# Patient Record
Sex: FEMALE | Race: WHITE | Employment: OTHER | URBAN - METROPOLITAN AREA
[De-identification: names, ages, dates, MRNs, and addresses within clinical notes are randomized per-mention and may not be internally consistent; named-entity substitution may affect disease eponyms.]

---

## 2017-11-03 ENCOUNTER — HOSPITAL ENCOUNTER (OUTPATIENT)
Dept: MAMMOGRAPHY | Age: 62
Discharge: HOME | End: 2017-11-03
Attending: OBSTETRICS & GYNECOLOGY
Payer: MEDICARE

## 2017-11-03 ENCOUNTER — HOSPITAL ENCOUNTER (OUTPATIENT)
Dept: MAMMOGRAPHY | Age: 62
Discharge: HOME | End: 2017-11-03
Attending: INTERNAL MEDICINE
Payer: MEDICARE

## 2017-11-03 DIAGNOSIS — Z12.31 VISIT FOR SCREENING MAMMOGRAM: ICD-10-CM

## 2017-11-03 DIAGNOSIS — M85.80 OSTEOPENIA: ICD-10-CM

## 2017-11-03 PROCEDURE — 77080 DXA BONE DENSITY AXIAL: CPT

## 2017-11-03 PROCEDURE — 77067 SCR MAMMO BI INCL CAD: CPT

## 2018-11-26 ENCOUNTER — HOSPITAL ENCOUNTER (OUTPATIENT)
Dept: MAMMOGRAPHY | Age: 63
Discharge: HOME | End: 2018-11-26
Attending: OBSTETRICS & GYNECOLOGY
Payer: MEDICARE

## 2018-11-26 DIAGNOSIS — Z12.31 VISIT FOR SCREENING MAMMOGRAM: ICD-10-CM

## 2018-11-26 PROCEDURE — 77067 SCR MAMMO BI INCL CAD: CPT

## 2019-06-11 ENCOUNTER — HOSPITAL ENCOUNTER (EMERGENCY)
Age: 64
Discharge: HOME | End: 2019-06-11
Attending: EMERGENCY MEDICINE
Payer: MEDICARE

## 2019-06-11 VITALS
RESPIRATION RATE: 18 BRPM | BODY MASS INDEX: 24.84 KG/M2 | HEIGHT: 62 IN | HEART RATE: 90 BPM | DIASTOLIC BLOOD PRESSURE: 86 MMHG | OXYGEN SATURATION: 99 % | SYSTOLIC BLOOD PRESSURE: 176 MMHG | WEIGHT: 135 LBS | TEMPERATURE: 98.1 F

## 2019-06-11 DIAGNOSIS — M54.50 ACUTE MIDLINE LOW BACK PAIN WITHOUT SCIATICA: Primary | ICD-10-CM

## 2019-06-11 LAB
ALBUMIN SERPL-MCNC: 4.4 G/DL (ref 3.2–4.6)
ALBUMIN/GLOB SERPL: 1.3 {RATIO} (ref 1.2–3.5)
ALP SERPL-CCNC: 64 U/L (ref 50–136)
ALT SERPL-CCNC: 29 U/L (ref 12–65)
ANION GAP SERPL CALC-SCNC: 8 MMOL/L (ref 7–16)
AST SERPL-CCNC: 28 U/L (ref 15–37)
BACTERIA URNS QL MICRO: 0 /HPF
BASOPHILS # BLD: 0 K/UL (ref 0–0.2)
BASOPHILS NFR BLD: 0 % (ref 0–2)
BILIRUB SERPL-MCNC: 0.3 MG/DL (ref 0.2–1.1)
BUN SERPL-MCNC: 17 MG/DL (ref 8–23)
CALCIUM SERPL-MCNC: 10.4 MG/DL (ref 8.3–10.4)
CASTS URNS QL MICRO: 0 /LPF
CHLORIDE SERPL-SCNC: 105 MMOL/L (ref 98–107)
CO2 SERPL-SCNC: 28 MMOL/L (ref 21–32)
CREAT SERPL-MCNC: 0.78 MG/DL (ref 0.6–1)
DIFFERENTIAL METHOD BLD: ABNORMAL
EOSINOPHIL # BLD: 0 K/UL (ref 0–0.8)
EOSINOPHIL NFR BLD: 1 % (ref 0.5–7.8)
EPI CELLS #/AREA URNS HPF: 0 /HPF
ERYTHROCYTE [DISTWIDTH] IN BLOOD BY AUTOMATED COUNT: 12.3 % (ref 11.9–14.6)
GLOBULIN SER CALC-MCNC: 3.5 G/DL (ref 2.3–3.5)
GLUCOSE SERPL-MCNC: 137 MG/DL (ref 65–100)
HCT VFR BLD AUTO: 44.3 % (ref 35.8–46.3)
HGB BLD-MCNC: 15.1 G/DL (ref 11.7–15.4)
IMM GRANULOCYTES # BLD AUTO: 0 K/UL (ref 0–0.5)
IMM GRANULOCYTES NFR BLD AUTO: 0 % (ref 0–5)
LYMPHOCYTES # BLD: 1.2 K/UL (ref 0.5–4.6)
LYMPHOCYTES NFR BLD: 38 % (ref 13–44)
MCH RBC QN AUTO: 30.6 PG (ref 26.1–32.9)
MCHC RBC AUTO-ENTMCNC: 34.1 G/DL (ref 31.4–35)
MCV RBC AUTO: 89.9 FL (ref 79.6–97.8)
MONOCYTES # BLD: 0.2 K/UL (ref 0.1–1.3)
MONOCYTES NFR BLD: 7 % (ref 4–12)
NEUTS SEG # BLD: 1.6 K/UL (ref 1.7–8.2)
NEUTS SEG NFR BLD: 54 % (ref 43–78)
NRBC # BLD: 0 K/UL (ref 0–0.2)
PLATELET # BLD AUTO: 201 K/UL (ref 150–450)
PMV BLD AUTO: 9.9 FL (ref 9.4–12.3)
POTASSIUM SERPL-SCNC: 3.7 MMOL/L (ref 3.5–5.1)
PROT SERPL-MCNC: 7.9 G/DL (ref 6.3–8.2)
RBC # BLD AUTO: 4.93 M/UL (ref 4.05–5.2)
RBC #/AREA URNS HPF: NORMAL /HPF
SODIUM SERPL-SCNC: 141 MMOL/L (ref 136–145)
WBC # BLD AUTO: 3.1 K/UL (ref 4.3–11.1)
WBC URNS QL MICRO: NORMAL /HPF

## 2019-06-11 PROCEDURE — 99284 EMERGENCY DEPT VISIT MOD MDM: CPT | Performed by: EMERGENCY MEDICINE

## 2019-06-11 PROCEDURE — 85025 COMPLETE CBC W/AUTO DIFF WBC: CPT

## 2019-06-11 PROCEDURE — 81015 MICROSCOPIC EXAM OF URINE: CPT

## 2019-06-11 PROCEDURE — 81003 URINALYSIS AUTO W/O SCOPE: CPT | Performed by: EMERGENCY MEDICINE

## 2019-06-11 PROCEDURE — 80053 COMPREHEN METABOLIC PANEL: CPT

## 2019-06-11 RX ORDER — METHOCARBAMOL 750 MG/1
750 TABLET, FILM COATED ORAL 4 TIMES DAILY
Qty: 30 TAB | Refills: 0 | Status: SHIPPED | OUTPATIENT
Start: 2019-06-11 | End: 2019-06-19

## 2019-06-11 RX ORDER — OXYCODONE HYDROCHLORIDE 5 MG/1
5 TABLET ORAL
Qty: 15 TAB | Refills: 0 | Status: SHIPPED | OUTPATIENT
Start: 2019-06-11 | End: 2019-06-16

## 2019-06-11 RX ORDER — PROMETHAZINE HYDROCHLORIDE 25 MG/1
25 TABLET ORAL
Qty: 15 TAB | Refills: 0 | Status: SHIPPED | OUTPATIENT
Start: 2019-06-11

## 2019-06-11 NOTE — ED TRIAGE NOTES
Pt arrives with c/o lower back pain, numbness radiating down bilateral thighs and into great toes. Hx chronic back pain, followed by chiropractor. Seen by chiropractor today, sent to ed. Reports onset of worsening back pain approx 1 weeks pta. Denies reinjury or trauma. Reports intermittent episodes of urinary incontinence over last 2 weeks. Reports 2 episodes bowel incontinence however states resolved after \"adjusted\" by chriopractor. Reports pain relieved with cold. Attempted muscle relaxers and acupuncture. Ambulatory with steady gait into triage.

## 2019-06-12 NOTE — ED NOTES
I have reviewed discharge instructions with the patient. The patient verbalized understanding. Patient left ED via Discharge Method: ambulatory to Home with . Opportunity for questions and clarification provided. Patient given 3 scripts. To continue your aftercare when you leave the hospital, you may receive an automated call from our care team to check in on how you are doing. This is a free service and part of our promise to provide the best care and service to meet your aftercare needs.  If you have questions, or wish to unsubscribe from this service please call [phone]. Thank you for Choosing our [employer] [facility] Emergency Department.

## 2019-06-12 NOTE — ED PROVIDER NOTES
Patient states she has had back pain off and on for many years. She sees a chiropractor for this. Typically with improvement in her pain. For the past week, she has had much more severe pain. She describes it as midline lower nonradiating back pain, worse with certain movements. She denies any radiation to her legs. She has had some slight occasional paresthesias to her anterior thighs. She went to her chiropractor today who got concerned about the severity of her pain and the paresthesias in her legs and wondered her to get an MRI. She called her brother who is a neurologist who also said that she would likely need an MRI. She presents with her back pain, requesting an MRI. She denies any symptoms of saddle anesthesia or urinary or bowel incontinence/retention. Elements of this note were created using speech recognition software. As such, errors of speech recognition may be present.            Past Medical History:   Diagnosis Date    Chemotherapy convalescence or palliative care currently since 10.2013    leukemia    Leukemia ([facility] [address] 10.2013    Squamous cell carcinoma in situ of skin of left lower leg 5.2013       Past Surgical History:   Procedure Laterality Date    US GUIDED CORE BREAST BIOPSY Right 8.2007    benign         Family History:   Problem Relation Age of Onset    Breast Cancer Neg Hx        Social History     Socioeconomic History    Marital status:      Spouse name: Not on file    Number of children: Not on file    Years of education: Not on file    Highest education level: Not on file   Occupational History    Not on file   Social Needs    Financial resource strain: Not on file    Food insecurity:     Worry: Not on file     Inability: Not on file    Transportation needs:     Medical: Not on file     Non-medical: Not on file   Tobacco Use    Smoking status: Not on file   Substance and Sexual Activity    Alcohol use: Not on file    Drug use: Not on file    Sexual activity: Not on file   Lifestyle    Physical activity:     Days per week: Not on file     Minutes per session: Not on file    Stress: Not on file   Relationships    Social connections:     Talks on phone: Not on file     Gets together: Not on file     Attends Yazidism service: Not on file     Active member of club or organization: Not on file     Attends meetings of clubs or organizations: Not on file     Relationship status: Not on file    Intimate partner violence:     Fear of current or ex partner: Not on file     Emotionally abused: Not on file     Physically abused: Not on file     Forced sexual activity: Not on file   Other Topics Concern    Not on file   Social History Narrative    Not on file         ALLERGIES: Betadine [povidone-iodine] and Sulfa (sulfonamide antibiotics)    Review of Systems   Constitutional: Negative for chills and fever. Gastrointestinal: Negative for nausea and vomiting. All other systems reviewed and are negative. Vitals:    06/11/19 1923 06/11/19 2001   BP: (!) 189/95 186/82   Pulse: (!) 107 93   Resp: 18    Temp: 98.1 °F (36.7 °C)    SpO2: 98% 100%   Weight: 61.2 kg (135 lb)    Height: 5' 2\" (1.575 m)             Physical Exam   Constitutional: She is oriented to person, place, and time. She appears well-developed and well-nourished. HENT:   Head: Normocephalic and atraumatic. Eyes: Pupils are equal, round, and reactive to light. Conjunctivae are normal.   Neck: Normal range of motion. Neck supple. Musculoskeletal: She exhibits tenderness. She exhibits no edema. Back:    tenderness to palpation lower back midline as indicated   Neurological: She is alert and oriented to person, place, and time. 5/5 strength bilateral lower extremities in ankle flexion and extension and leg raise. She has normal sensation in her lower extremities   Skin: Skin is warm and dry. Psychiatric: She has a normal mood and affect.  Her behavior is normal.   Nursing note and vitals reviewed. MDM  Number of Diagnoses or Management Options  Acute midline low back pain without sciatica: new and requires workup  Diagnosis management comments: 9:14 PM patient shows no signs of cauda equina syndrome.   She does have an acute exacerbation of her back pain, the plan is to get an outpatient MRI tomorrow and she went to get it done at [facility]       Amount and/or Complexity of Data Reviewed  Clinical lab tests: reviewed  Tests in the radiology section of CPT®: ordered    Risk of Complications, Morbidity, and/or Mortality  Presenting problems: moderate  Diagnostic procedures: moderate  Management options: moderate    Patient Progress  Patient progress: stable         Procedures

## 2019-06-12 NOTE — DISCHARGE INSTRUCTIONS
Follow-up with [facility] tomorrow for your outpatient MRI. Return to the emergency department if your symptoms worsen.

## 2019-11-29 ENCOUNTER — HOSPITAL ENCOUNTER (OUTPATIENT)
Dept: MAMMOGRAPHY | Age: 64
Discharge: HOME | End: 2019-11-29
Attending: OBSTETRICS & GYNECOLOGY
Payer: MEDICARE

## 2019-11-29 DIAGNOSIS — Z12.31 VISIT FOR SCREENING MAMMOGRAM: ICD-10-CM

## 2019-11-29 PROCEDURE — 77067 SCR MAMMO BI INCL CAD: CPT

## 2020-07-21 ENCOUNTER — HOSPITAL ENCOUNTER (OUTPATIENT)
Dept: PHYSICAL THERAPY | Age: 65
Discharge: HOME | End: 2020-07-21
Payer: MEDICARE

## 2020-07-21 PROCEDURE — 97110 THERAPEUTIC EXERCISES: CPT

## 2020-07-21 PROCEDURE — 97161 PT EVAL LOW COMPLEX 20 MIN: CPT

## 2020-07-21 NOTE — PROGRESS NOTES
Didi Preciado  : 1955  [place]  Secondary: Andrea Harris at [facility]  [address], [address], [place] [address]  [phone]   [phone]      OUTPATIENT PHYSICAL THERAPY: Daily Treatment Note 2020  ICD-10: Treatment Diagnosis: R27.8 Lack of coordination (muscle incoordination), R35.0 Frequency of micturition, R39.15 Urgency of Urination Excludes1: urge incontinence (N39.41,N39.46)    Precautions/Allergies:   Betadine [povidone-iodine] and Sulfa (sulfonamide antibiotics)   TREATMENT PLAN:  Effective Dates: 2020 TO 10/19/2020 (90 days). Frequency/Duration: 1 time a week for 90 Day(s) MEDICAL/REFERRING DIAGNOSIS:  Urinary frequency [R35.0]   DATE OF ONSET: 1 year ago  REFERRING PHYSICIAN: Pierce Reyes,*  MD Orders: evaluate and treat  Return MD Appointment: -     Pre-treatment Symptoms/Complaints:  See evaluation  Pain: Initial:   0 Post Session:  010   Medications Last Reviewed:  2020   Updated Objective Findings:  See evaluation note from today   TREATMENT:   THERAPEUTIC EXERCISE: (10 minutes):  Exercises per grid below to improve mobility, strength and coordination. Required minimal visual, verbal and manual cues to promote proper body alignment, promote proper body posture and promote proper body mechanics. Progressed resistance, range and repetitions as indicated. All exercises performed with emphasis on kegel and breathing in order improve coordination, awareness and to minimize symptoms. Date:  20 Date:   Date:     Activity/Exercise Parameters Parameters Parameters   Patient Education Discussed HEP and POC     Bladder health reviewed     drops x10                                Pt gives verbal consent to internal  assessment/treatment  no chaperon present.      [employer] [facility]     Treatment/Session Summary:  Pt reports good understanding of plan of care, as well as prescribed home exercise program.  All questions were answered to pt's satisfaction. Pt was invited to call with any further questions or concerns. · Response to Treatment:  see evaluation. · Communication/Consultation:  None today  · Equipment provided today:  None today  · Recommendations/Intent for next treatment session: Next visit will focus on biofeedback, urge suppression, manual, there ex.   Total Treatment Billable Duration:  10 minutes  PT Patient Time In/Time Out  Time In: 1300  Time Out: [address], DPT    Future Appointments   Date Time Provider Shlomo Hankins   7/29/2020  2:00 PM Merrily Sicilian, DPT [facility]   8/5/2020  2:00 PM Merrily Sicilian, DPT [facility]   8/11/2020  2:00 PM Merrily Sicilian, DPT [facility]   8/20/2020  2:00 PM Merrily Sicilian, DPT [facility]   9/3/2020  2:00 PM Merrily Sicilian, DPT [facility]   9/10/2020  2:00 PM Merrily Sicilian, DPT [facility]   11/30/2020  3:30 PM [facility]

## 2020-07-21 NOTE — THERAPY EVALUATION
Maile Lockwood  : 1955  [place]  Secondary: [facility] at [facility], [address], [place] [address]  [phone]   [phone]       OUTPATIENT PHYSICAL THERAPY:Initial Assessment 2020   ICD-10: Treatment Diagnosis: R27.8 Lack of coordination (muscle incoordination), R35.0 Frequency of micturition, R39.15 Urgency of Urination Excludes1: urge incontinence (N39.41,N39.46)    Precautions/Allergies:   Betadine [povidone-iodine] and Sulfa (sulfonamide antibiotics)   TREATMENT PLAN:  Effective Dates: 2020 TO 10/19/2020 (90 days). Frequency/Duration: 1 time a week for 90 Day(s) MEDICAL/REFERRING DIAGNOSIS:  Urinary frequency [R35.0]   DATE OF ONSET: 1 year ago  REFERRING PHYSICIAN: Jacky Santa,*  MD Orders: evaluate and treat  Return MD Appointment: -     INITIAL ASSESSMENT:  Ms. Nakul Love presents with pelvic floor muscle over activity, lack of coordination, and strength. . Patient also has mild bladder health habits that may be worsening symptoms. Patient would benefit from skilled physical therapy to address her deficits and maximize her function. PROBLEM LIST (Impacting functional limitations):  1. Decreased Strength  2. Decreased Flexibility/Joint Mobility  3. Decreased Amo with Home Exercise Program  4. Decreased coordination INTERVENTIONS PLANNED:  1. Family Education  2. Home Exercise Program (HEP)  3. Manual Therapy  4. Neuromuscular Re-education/Strengthening  5. Therapeutic Activites  6. Therapeutic Exercise/Strengthening     GOALS: (Goals have been discussed and agreed upon with patient.)  1. Patient will verbalize an understanding of pelvic anatomy and causes of incontinence   2. Patient will demonstrate I with basic PFM HEP to improve awareness, coordination, and timing of PFM  3.  Patient will demonstrate 10 quick flicks of the pelvic floor muscle group, without compensation, to implement urge suppression appropriately with urgency of urination and decrease the number of pads used per day. 4. Pt with demonstrate normal voluntary relaxation of the pelvic floor muscle group to improve pelvic floor ROM and decrease pain. 5. Pt will independently perform proper toilet position to improve bowel emptying. OUTCOME MEASURE:   Tool Used: Pelvic Floor Impact Questionnaire--short form 7 (PFIQ-7)   Score:  Initial:   · Bladder or Urine: 0  · Bowel or Rectum: 0  · Vagina or Pelvis: 0 Most Recent: X (Date: -- )  · Bladder or Urine: X  · Bowel or Rectum: X  · Vagina or Pelvis: X   Interpretation of Score: Each of the 7 sections is scored on a scale from 0-3; 0 representing \"Not at all\", 3 representing \"Quite a bit\". The mean value is taken from all the answered items, then multiplied by 100 to obtain the scale score, ranging from 0-100. This process is repeated for each column representing bowel, bladder, and pelvic pain. Medical Necessity:   · Patient demonstrates GOOD rehab potential due to higher previous functional level. Reason for Services/Other Comments:  · Patient continues to require skilled intervention due to above mentioned deficits  . Total Duration:   PT Patient Time In/Time Out  Time In: 1300  Time Out: 1400    Rehabilitation Potential For Stated Goals: Good  Regarding Yeison Herndon Chris's therapy, I certify that the treatment plan above will be carried out by a therapist or under their direction. Thank you for this referral,  Jodi Ronquillo DPT     Referring Physician Signature: Alis Byrd,* _______________________________ Date _____________              HISTORY:   History of Present Injury/Illness (Reason for Referral):  Ms. Dani Elliott is a 77 yo female referred to pelvic floor physical therapy (PFPT) by Alis Byrd,* 2/2 urinary frequency. Pt reports that symptoms began 1 year ago.  Laura Washington reports she lifted a very heavy wool carpet the next morning she couldn't walk. She had problems with her psoas. She had physical therapy done for this. But all of this started her bladder problems. She thought her bladder dropped. She doesn't empty all the way. Urgency increased. She had an A/P repair back in 80. She feels like her bladder is right there. She got checked out by her OBGYN. She does not want to have a pessary at this time. She uses a natural cream (DHEA) to help with moisture. She feels like every time she stands up she has to use the bathroom. If she is not that full she can stop the flow of urine. If she has a strong flow she cannot stop it. Urinary: Frequency 9-12 x/day, 1 x/night. Positive for urinary urgency, urinary frequency and incomplete emptying. Negative for stress urinary incontinence, urge urinary incontinence, urinary hesitancy/intermittency, dysuria, hematuria, nocturia and nocturnal enuresis. Pt does not use pads for urinary protection; 0 pads per day (PPD). Fluid intake: 60 oz water/day; bladder irritants include: 1-2 cups coffee in am, glass of wine in evening. Bowel: Frequency varies. Positive for constipation. Negative for pain with bowel movement, pushing/straining with bowel movement, fecal incontinence and incomplete emptying. Use of stool softeners or laxatives? YES  Sexual: Pt is sexually active with male partners. negative for dyspareunia. She feels like something is blocking, states its uncomfortable. Uses lubrication. Pelvic Organ Prolapse/Pelvic Pain: Location: bladder. OB History: Number of pregnancies: 3 Number of vaginal deliveries: 2 Number of c-sections: 0. Past Medical History/Comorbidities:   Ms. David Santos  has a past medical history of Chemotherapy convalescence or palliative care (currently since 10.2013), Leukemia ([facility] [address] (10.2013), and Squamous cell carcinoma in situ of skin of left lower leg (5.2013). She also has no past medical history of Radiation therapy complication. Ms. Danyelle Maurer  has a past surgical history that includes us guided core breast biopsy (Right, 8.2007). Social History/Living Environment:     lives with   Have you ever had any pelvic trauma (orthopedic in nature, fall, MVA, etc.)? YES, fall to coccyx   Have you ever experienced any unwanted physical or sexual contact? NO   Have you ever experienced any form of medical trauma (GYN, urological, GI, etc)? 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 Ambulatory/Rehab Services H2 Model Falls Risk Assessment    Risk Factors:       No Risk Factors Identified Ability to Rise from Chair:       (0)  Ability to rise in a single movement    Falls Prevention Plan:       No modifications necessary   Total: (5 or greater = High Risk): 0    ©2010 [facility] of [employer]. All Rights Reserved. [facility] [employer] Patent #[id number]. Federal Law prohibits the replication, distribution or use without written permission from [facility] [employer]     Current Medications:       Current Outpatient Medications:     promethazine (PHENERGAN) 25 mg tablet, Take 1 Tab by mouth every six (6) hours as needed for Nausea for up to 15 doses.  Indications: nausea, Disp: 15 Tab, Rfl: 0   Date Last Reviewed:  7/21/2020   Number of Personal Factors/Comorbidities that affect the Plan of Care: 0: LOW COMPLEXITY   EXAMINATION:   OBSERVATION:   External Observations:   Voluntary contraction: [] absent     [x] present      Pelvic Floor Muscle (PFM) Assessment:   Vaginal vault size: [] decreased     [] increased     [x] WNL   Muscle volume: [] decreased     [x] WNL     [] Defect   PFM tension: [] decreased     [x] increased     [] WNL    Contraction ability:  Voluntary contraction: [] absent [] weak     [] moderate      [] strong  Voluntary relaxation: [] absent     [] partial     [] complete   MMT: 2-3/5   PFM endurance: 3 seconds   Repetitions of endurance contractions: 4        Palpation: via vaginal canal   Superficial Pelvic Floor Musculature (PFM): Tender? Intermediate PFM Tender? Deep PFM Tender? Superficial Transverse Perineal [] Right  [] Left  []N/T Deep Transverse Perineal [] Right  [] Left  []N/T Puborectalis [] Right  [x] Left  []N/T   Ischiocavernosus [] Right  [] Left  []N/T Compressor Urethra [] Right  [] Left  []N/T Pubococcygeus [] Right  [x] Left  []N/T   Bulbocavernosus [] Right  [] Left  []N/T   Ileococcygeus [] Right  [x] Left  []N/T       Obturator Internus [] Right  [x] Left  []N/T       Coccygeus [] Right  [] Left  []N/T              Body Structures Involved:  1. Muscles Body Functions Affected:  1. Genitourinary  2. Neuromusculoskeletal  3. Movement Related Activities and Participation Affected:  1. Self Care  2. Domestic Life  3. Interpersonal Interactions and Relationships  4.  Community, Social and [place]   Number of elements (examined above) that affect the Plan of Care: 1-2: LOW COMPLEXITY   CLINICAL PRESENTATION:   Presentation: Stable and uncomplicated: LOW COMPLEXITY   CLINICAL DECISION MAKING:   Use of outcome tool(s) and clinical judgement create a POC that gives a: Clear prediction of patient's progress: LOW COMPLEXITY

## 2020-07-28 NOTE — PROGRESS NOTES
Musa Lincoln  : 1955  [place]  Secondary: Andrea Harris at [facility] , [address], [place] [address]  [phone]   [phone]      OUTPATIENT PHYSICAL THERAPY: Daily Treatment Note 2020  ICD-10: Treatment Diagnosis: R27.8 Lack of coordination (muscle incoordination), R35.0 Frequency of micturition, R39.15 Urgency of Urination Excludes1: urge incontinence (N39.41,N39.46)    Precautions/Allergies:   Betadine [povidone-iodine] and Sulfa (sulfonamide antibiotics)   TREATMENT PLAN:  Effective Dates: 2020 TO 10/19/2020 (90 days). Frequency/Duration: 1 time a week for 90 Day(s) MEDICAL/REFERRING DIAGNOSIS:  Urinary frequency [R35.0]   DATE OF ONSET: 1 year ago  REFERRING PHYSICIAN: Enrico Barroso,*  MD Orders: evaluate and treat  Return MD Appointment: -     Pre-treatment Symptoms/Complaints:  Patient reports she did her homework. She goes to the bathroom a little bit less now. She went to her chiropractor the other day and is more sore which is unusual for her. Pain: Initial:   0 Post Session:  0/10   Medications Last Reviewed:  2020   Updated Objective Findings:  see below   Ms. Betsy Urena is a 77 yo female referred to pelvic floor physical therapy (PFPT) by Enrico Barroso,* 2/2 urinary frequency. Pt reports that symptoms began 1 year ago. . John Tucker reports she lifted a very heavy wool carpet the next morning she couldn't walk. She had problems with her psoas. She had physical therapy done for this. But all of this started her bladder problems. She thought her bladder dropped. She doesn't empty all the way. Urgency increased. She had an A/P repair back in 80. She feels like her bladder is right there. She got checked out by her OBGYN. She does not want to have a pessary at this time. She uses a natural cream (DHEA) to help with moisture.  She feels like every time she stands up she has to use the bathroom. If she is not that full she can stop the flow of urine. If she has a strong flow she cannot stop it.       Urinary: Frequency 9-12 x/day, 1 x/night. Positive for urinary urgency, urinary frequency and incomplete emptying. Negative for stress urinary incontinence, urge urinary incontinence, urinary hesitancy/intermittency, dysuria, hematuria, nocturia and nocturnal enuresis. Pt does not use pads for urinary protection; 0 pads per day (PPD). Fluid intake: 60 oz water/day; bladder irritants include: 1-2 cups coffee in am, glass of wine in evening. Bowel: Frequency varies. Positive for constipation. Negative for pain with bowel movement, pushing/straining with bowel movement, fecal incontinence and incomplete emptying. Use of stool softeners or laxatives? YES  Sexual: Pt is sexually active with male partners. negative for dyspareunia. She feels like something is blocking, states its uncomfortable. Uses lubrication. Pelvic Organ Prolapse/Pelvic Pain: Location: bladder. OB History: Number of pregnancies: 3 Number of vaginal deliveries: 2 Number of c-sections: 0. External Observations:  · Voluntary contraction: []? absent     [x]? present        Pelvic Floor Muscle (PFM) Assessment:  · Vaginal vault size: []? decreased     []? increased     [x]? WNL  · Muscle volume: []? decreased     [x]? WNL     []? Defect  · PFM tension: []? decreased     [x]? increased     []? WNL     Contraction ability:  Voluntary contraction: []? absent     []? weak     []? moderate      []? strong  Voluntary relaxation: []? absent     []? partial     []? complete   MMT: 2-3/5   PFM endurance: 3 seconds   Repetitions of endurance contractions: 4           Palpation: via vaginal canal   Superficial Pelvic Floor Musculature (PFM): Tender? Intermediate PFM Tender? Deep PFM Tender? Superficial Transverse Perineal []? Right  []? Left  []? N/T Deep Transverse Perineal []? Right  []? Left  []? N/T Puborectalis []? Right  [x]? Left  []? N/T   Ischiocavernosus []? Right  []? Left  []? N/T Compressor Urethra []? Right  []? Left  []? N/T Pubococcygeus []? Right  [x]? Left  []? N/T   Bulbocavernosus []? Right  []? Left  []? N/T     Ileococcygeus []? Right  [x]? Left  []? N/T           Obturator Internus []? Right  [x]? Left  []? N/T           Coccygeus []? Right  []? Left  []? N/T        TREATMENT:   THERAPEUTIC EXERCISE: (0 minutes):  Exercises per grid below to improve mobility, strength and coordination. Required minimal visual, verbal and manual cues to promote proper body alignment, promote proper body posture and promote proper body mechanics. Progressed resistance, range and repetitions as indicated. All exercises performed with emphasis on kegel and breathing in order improve coordination, awareness and to minimize symptoms. Date:  7-21-20 Date:   Date:     Activity/Exercise Parameters Parameters Parameters   Patient Education Discussed HEP and POC     Bladder health reviewed     drops x10                                Pt gives verbal consent to internal  assessment/treatment  no chaperon present. NEURO REEDUCATION: (30 minutes) to improve control and coordination of pelvic floor     Date:  7-28-20 Date:   Date:     Activity/Exercise Parameters Parameters Parameters   Biofeedback With sEMG was utilized for coordination of PFM.  Supine, Sitting, Standing, prone                                           MANUAL THERAPY: (30 minutes) to improve soft tissue tone    Date Type Location Comments   7/29/2020 Internal assessment/treatment Lumbar spine and coccyx  Extension mobilzation    KT tape Coccyx I strip with 75% tension                                    (Used abbreviations: MET - muscle energy technique; SCS- Strain counter strain; CTM-Connective tissue mobilizations; CR- Contract/relax; SP- Sustained pressure, TrP-Trigger point release, IASTM- Instrument assisted soft tissue mobilizations, TDN-Trigger point dry needling)          [facility] Portal     Treatment/Session Summary:  Pt reports good understanding of plan of care, as well as prescribed home exercise program.  All questions were answered to pt's satisfaction. Pt was invited to call with any further questions or concerns. · Response to Treatment:  Patient has difficulty relaxing pelvic floor due to low back/coccyx pain. Patient was able to relax the best in prone position. After some manual to the low back her pain decreased and PFM relaxed better. When patient does a posterior pelvic tilt she is unable to keep pelvic floor relaxed. Discussed how we need to retrain both pelvic floor and core muscles. Discussed how to care for a remove KT tape as well as signs to look for if there is irritation. · Communication/Consultation:  None today  · Equipment provided today:  None today  · Recommendations/Intent for next treatment session: Next visit will focus on biofeedback, urge suppression, manual, there ex.   Total Treatment Billable Duration:  30 minutes neuro, 25 minutes manual  PT Patient Time In/Time Out  Time In: 1400  Time Out: [address], [place]    Future Appointments   Date Time Provider Shlomo Hankins   8/5/2020  2:00 PM Ishmael Solano, DPT [facility]   8/11/2020  2:00 PM Rice Russ, DPT [facility]   8/20/2020  2:00 PM Ishmael Solano, DPT [facility]   9/3/2020  2:00 PM Rice Russ, DPT [facility]   9/10/2020  2:00 PM Rice Russ, DPT [facility]   11/30/2020  3:30 PM [facility] ROOM 4 [place] GARCIA

## 2020-07-29 ENCOUNTER — HOSPITAL ENCOUNTER (OUTPATIENT)
Dept: PHYSICAL THERAPY | Age: 65
Discharge: HOME | End: 2020-07-29
Payer: MEDICARE

## 2020-07-29 PROCEDURE — 97140 MANUAL THERAPY 1/> REGIONS: CPT

## 2020-07-29 PROCEDURE — 97112 NEUROMUSCULAR REEDUCATION: CPT

## 2020-08-04 NOTE — PROGRESS NOTES
Aubrie Mraion  : 1955  [place]  Secondary: Andrea Harris at [facility]  [address] , [address], [place] [address]  [phone]   [phone]      OUTPATIENT PHYSICAL THERAPY: Daily Treatment Note 2020  ICD-10: Treatment Diagnosis: R27.8 Lack of coordination (muscle incoordination), R35.0 Frequency of micturition, R39.15 Urgency of Urination Excludes1: urge incontinence (N39.41,N39.46)    Precautions/Allergies:   Betadine [povidone-iodine] and Sulfa (sulfonamide antibiotics)   TREATMENT PLAN:  Effective Dates: 2020 TO 10/19/2020 (90 days). Frequency/Duration: 1 time a week for 90 Day(s) MEDICAL/REFERRING DIAGNOSIS:  Urinary frequency [R35.0]   DATE OF ONSET: 1 year ago  REFERRING PHYSICIAN: Alon Neumann,*  MD Orders: evaluate and treat  Return MD Appointment: -     Pre-treatment Symptoms/Complaints:  Patient reports her urgency/frequency has greatly decreased. She went to a massage therapist and they did Dolphin (neuro stim) to her low back and hip. She has felt much better. Pain: Initial:   0 Post Session:  0/10   Medications Last Reviewed:  2020   Updated Objective Findings:  see below   Ms. Angelo Vickers is a 75 yo female referred to pelvic floor physical therapy (PFPT) by Alon Neumann,* 2/2 urinary frequency. Pt reports that symptoms began 1 year ago. . Stefanie Starr reports she lifted a very heavy wool carpet the next morning she couldn't walk. She had problems with her psoas. She had physical therapy done for this. But all of this started her bladder problems. She thought her bladder dropped. She doesn't empty all the way. Urgency increased. She had an A/P repair back in 80. She feels like her bladder is right there. She got checked out by her OBGYN. She does not want to have a pessary at this time. She uses a natural cream (DHEA) to help with moisture.  She feels like every time she stands up she has to use the bathroom. If she is not that full she can stop the flow of urine. If she has a strong flow she cannot stop it.       Urinary: Frequency 9-12 x/day, 1 x/night. Positive for urinary urgency, urinary frequency and incomplete emptying. Negative for stress urinary incontinence, urge urinary incontinence, urinary hesitancy/intermittency, dysuria, hematuria, nocturia and nocturnal enuresis. Pt does not use pads for urinary protection; 0 pads per day (PPD). Fluid intake: 60 oz water/day; bladder irritants include: 1-2 cups coffee in am, glass of wine in evening. Bowel: Frequency varies. Positive for constipation. Negative for pain with bowel movement, pushing/straining with bowel movement, fecal incontinence and incomplete emptying. Use of stool softeners or laxatives? YES  Sexual: Pt is sexually active with male partners. negative for dyspareunia. She feels like something is blocking, states its uncomfortable. Uses lubrication. Pelvic Organ Prolapse/Pelvic Pain: Location: bladder. OB History: Number of pregnancies: 3 Number of vaginal deliveries: 2 Number of c-sections: 0. External Observations:  · Voluntary contraction: []? absent     [x]? present        Pelvic Floor Muscle (PFM) Assessment:  · Vaginal vault size: []? decreased     []? increased     [x]? WNL  · Muscle volume: []? decreased     [x]? WNL     []? Defect  · PFM tension: []? decreased     [x]? increased     []? WNL     Contraction ability:  Voluntary contraction: []? absent     []? weak     []? moderate      []? strong  Voluntary relaxation: []? absent     []? partial     []? complete   MMT: 2-3/5   PFM endurance: 3 seconds   Repetitions of endurance contractions: 4           Palpation: via vaginal canal   Superficial Pelvic Floor Musculature (PFM): Tender? Intermediate PFM Tender? Deep PFM Tender? Superficial Transverse Perineal []? Right  []? Left  []? N/T Deep Transverse Perineal []? Right  []? Left  []? N/T Puborectalis []? Right  [x]? Left  []? N/T   Ischiocavernosus []? Right  []? Left  []? N/T Compressor Urethra []? Right  []? Left  []? N/T Pubococcygeus []? Right  [x]? Left  []? N/T   Bulbocavernosus []? Right  []? Left  []? N/T     Ileococcygeus []? Right  [x]? Left  []? N/T           Obturator Internus []? Right  [x]? Left  []? N/T           Coccygeus []? Right  []? Left  []? N/T        TREATMENT:   THERAPEUTIC EXERCISE: (0 minutes):  Exercises per grid below to improve mobility, strength and coordination. Required minimal visual, verbal and manual cues to promote proper body alignment, promote proper body posture and promote proper body mechanics. Progressed resistance, range and repetitions as indicated. All exercises performed with emphasis on kegel and breathing in order improve coordination, awareness and to minimize symptoms. Date:  7-21-20 Date:   Date:     Activity/Exercise Parameters Parameters Parameters   Patient Education Discussed HEP and POC     Bladder health reviewed     drops x10                                Pt gives verbal consent to internal  assessment/treatment  no chaperon present. NEURO REEDUCATION: (0 minutes) to improve control and coordination of pelvic floor     Date:  7-28-20 Date:   Date:     Activity/Exercise Parameters Parameters Parameters   Biofeedback With sEMG was utilized for coordination of PFM.  Supine, Sitting, Standing, prone                                           MANUAL THERAPY: (50 minutes) to improve soft tissue tone    Date Type Location Comments   8/5/2020 Internal assessment/treatment Via vaginal canal SCS, CR, SP    KT tape Coccyx I strip with 75% tension       SI correction pelvis Left hamstring/Right Quad MET for correction    Shot-gun Pubic bone Abduction/adduction resistance       Lumbar spine Oscillations at end range Right rotation                 (Used abbreviations: MET - muscle energy technique; SCS- Strain counter strain; CTM-Connective tissue mobilizations; CR- Contract/relax; SP- Sustained pressure, TrP-Trigger point release, IASTM- Instrument assisted soft tissue mobilizations, TDN-Trigger point dry needling)    THERAPEUTIC ACTIVITY: (10 minutes)   -discussed, reviewed, performed urge suppression        [facility] Portal     Treatment/Session Summary:  Pt reports good understanding of plan of care, as well as prescribed home exercise program.  All questions were answered to pt's satisfaction. Pt was invited to call with any further questions or concerns. · Response to Treatment:  Patient's tightness decreased with internal manual therapy. Patient's pelvis and pubic bone corrected with manual as well. Continues to have difficulty isolating pelvic floor and TA muscles. Overall, patient does see improvements. Educated patient to not try too many \"therapies\" at once. · Communication/Consultation:  None today  · Equipment provided today:  None today  · Recommendations/Intent for next treatment session: Next visit will focus on  manual, there ex.   Total Treatment Billable Duration:  50 minutes manual, 10 minutes there act   PT Patient Time In/Time Out  Time In: 1400  Time Out: [address], [place]    Future Appointments   Date Time Provider Shlomo Hankins   8/11/2020  2:00 [facility]   8/20/2020  2:00 PM [place] [facility]   9/3/2020  2:00 PM [place] Joyce   9/10/2020  2:00 PM [place] [facility]   11/30/2020  3:30 PM [facility] BENOIT  [facility]

## 2020-08-05 ENCOUNTER — HOSPITAL ENCOUNTER (OUTPATIENT)
Dept: PHYSICAL THERAPY | Age: 65
Discharge: HOME | End: 2020-08-05
Payer: MEDICARE

## 2020-08-05 PROCEDURE — 97140 MANUAL THERAPY 1/> REGIONS: CPT

## 2020-08-05 PROCEDURE — 97530 THERAPEUTIC ACTIVITIES: CPT

## 2020-08-11 ENCOUNTER — HOSPITAL ENCOUNTER (OUTPATIENT)
Dept: PHYSICAL THERAPY | Age: 65
Discharge: HOME | End: 2020-08-11
Payer: MEDICARE

## 2020-08-11 PROCEDURE — 97110 THERAPEUTIC EXERCISES: CPT

## 2020-08-11 NOTE — PROGRESS NOTES
Shaye Cagle  : 1955  [place]  [facility]: [facility] [address], [place] [address]  [phone]   [phone]      OUTPATIENT PHYSICAL THERAPY: Daily Treatment Note 2020  ICD-10: Treatment Diagnosis: R27.8 Lack of coordination (muscle incoordination), R35.0 Frequency of micturition, R39.15 Urgency of Urination Excludes1: urge incontinence (N39.41,N39.46)    Precautions/Allergies:   Betadine [povidone-iodine] and Sulfa (sulfonamide antibiotics)   TREATMENT PLAN:  Effective Dates: 2020 TO 10/19/2020 (90 days). Frequency/Duration: 1 time a week for 90 Day(s) MEDICAL/REFERRING DIAGNOSIS:  Urinary frequency [R35.0]   DATE OF ONSET: 1 year ago  REFERRING PHYSICIAN: Derian Storey,*  MD Orders: evaluate and treat  Return MD Appointment: -     Pre-treatment Symptoms/Complaints:  Patient reports she feels much better. Urgency/frequency decreased. She doesn't feel like her bladder is hanging out. Pain: Initial:   0 Post Session:  0/10   Medications Last Reviewed:  2020   Updated Objective Findings:  see below   Ms. Matthew Obrien is a 75 yo female referred to pelvic floor physical therapy (PFPT) by Derian Storey,* 2/2 urinary frequency. Pt reports that symptoms began 1 year ago. . Alo Diamond reports she lifted a very heavy wool carpet the next morning she couldn't walk. She had problems with her psoas. She had physical therapy done for this. But all of this started her bladder problems. She thought her bladder dropped. She doesn't empty all the way. Urgency increased. She had an A/P repair back in 80. She feels like her bladder is right there. She got checked out by her OBGYN. She does not want to have a pessary at this time. She uses a natural cream (DHEA) to help with moisture. She feels like every time she stands up she has to use the bathroom.  If she is not that full she can stop the flow of urine. If she has a strong flow she cannot stop it.       Urinary: Frequency 9-12 x/day, 1 x/night. Positive for urinary urgency, urinary frequency and incomplete emptying. Negative for stress urinary incontinence, urge urinary incontinence, urinary hesitancy/intermittency, dysuria, hematuria, nocturia and nocturnal enuresis. Pt does not use pads for urinary protection; 0 pads per day (PPD). Fluid intake: 60 oz water/day; bladder irritants include: 1-2 cups coffee in am, glass of wine in evening. Bowel: Frequency varies. Positive for constipation. Negative for pain with bowel movement, pushing/straining with bowel movement, fecal incontinence and incomplete emptying. Use of stool softeners or laxatives? YES  Sexual: Pt is sexually active with male partners. negative for dyspareunia. She feels like something is blocking, states its uncomfortable. Uses lubrication. Pelvic Organ Prolapse/Pelvic Pain: Location: bladder. OB History: Number of pregnancies: 3 Number of vaginal deliveries: 2 Number of c-sections: 0. External Observations:  · Voluntary contraction: []? absent     [x]? present        Pelvic Floor Muscle (PFM) Assessment:  · Vaginal vault size: []? decreased     []? increased     [x]? WNL  · Muscle volume: []? decreased     [x]? WNL     []? Defect  · PFM tension: []? decreased     [x]? increased     []? WNL     Contraction ability:  Voluntary contraction: []? absent     []? weak     []? moderate      []? strong  Voluntary relaxation: []? absent     []? partial     []? complete   MMT: 2-3/5   PFM endurance: 3 seconds   Repetitions of endurance contractions: 4           Palpation: via vaginal canal   Superficial Pelvic Floor Musculature (PFM): Tender? Intermediate PFM Tender? Deep PFM Tender? Superficial Transverse Perineal []? Right  []? Left  []? N/T Deep Transverse Perineal []? Right  []? Left  []? N/T Puborectalis []? Right  [x]? Left  []? N/T   Ischiocavernosus []? Right  []? Left  []? N/T Compressor Urethra []? Right  []? Left  []? N/T Pubococcygeus []? Right  [x]? Left  []? N/T   Bulbocavernosus []? Right  []? Left  []? N/T     Ileococcygeus []? Right  [x]? Left  []? N/T           Obturator Internus []? Right  [x]? Left  []? N/T           Coccygeus []? Right  []? Left  []? N/T        TREATMENT:   THERAPEUTIC EXERCISE: (55 minutes):  Exercises per grid below to improve mobility, strength and coordination. Required minimal visual, verbal and manual cues to promote proper body alignment, promote proper body posture and promote proper body mechanics. Progressed resistance, range and repetitions as indicated. All exercises performed with emphasis on kegel and breathing in order improve coordination, awareness and to minimize symptoms. Date:  7-21-20 Date:  8-11-20 Date:     Activity/Exercise Parameters Parameters Parameters   Patient Education Discussed HEP and POC     Bladder health   reviewed     Drops   x10     Bridge    x15    Clamshell    x15    Seated March    x15    Sit to Stand    x15    Quadruped    x15    Rows    x15 blue band    Extensions    x15 blue band    Sidestepping    50 feet x 2             Pt gives verbal consent to internal  assessment/treatment  no chaperon present. NEURO REEDUCATION: (0 minutes) to improve control and coordination of pelvic floor     Date:  7-28-20 Date:   Date:     Activity/Exercise Parameters Parameters Parameters   Biofeedback With sEMG was utilized for coordination of PFM.  Supine, Sitting, Standing, prone                                           MANUAL THERAPY: (0 minutes) to improve soft tissue tone    Date Type Location Comments   8/11/2020 Internal assessment/treatment Via vaginal canal SCS, CR, SP    KT tape Coccyx I strip with 75% tension       SI correction pelvis Left hamstring/Right Quad MET for correction    Shot-gun Pubic bone Abduction/adduction resistance       Lumbar spine Oscillations at end range Right rotation (Used abbreviations: MET - muscle energy technique; SCS- Strain counter strain; CTM-Connective tissue mobilizations; CR- Contract/relax; SP- Sustained pressure, TrP-Trigger point release, IASTM- Instrument assisted soft tissue mobilizations, TDN-Trigger point dry needling)    THERAPEUTIC ACTIVITY: (0 minutes)   -discussed, reviewed, performed urge suppression        [id number] Portal     Treatment/Session Summary:  Pt reports good understanding of plan of care, as well as prescribed home exercise program.  All questions were answered to pt's satisfaction. Pt was invited to call with any further questions or concerns. · Response to Treatment:  Patient has shown much improvement in pelvic floor function and symptoms. Updated HEP  · Communication/Consultation:  None today  · Equipment provided today:  None today  · Recommendations/Intent for next treatment session: Next visit will focus on  manual, there ex.   Total Treatment Billable Duration:  55 minutes there ex  PT Patient Time In/Time Out  Time In: 1400  Time Out: [address] [facility] , DPT    Future Appointments   Date Time Provider Shlomo Hankins   8/20/2020  2:00 PM [place] [facility]erra [facility]   9/3/2020  2:00 PM Mari Torrez DPT [facility]   9/10/2020  2:00 PM Mari Torrez DPT [facility]   11/30/2020  3:30 PM [facility] [place] [facility]

## 2020-08-20 ENCOUNTER — HOSPITAL ENCOUNTER (OUTPATIENT)
Dept: PHYSICAL THERAPY | Age: 65
Discharge: HOME | End: 2020-08-20
Payer: MEDICARE

## 2020-08-20 PROCEDURE — 97110 THERAPEUTIC EXERCISES: CPT

## 2020-08-20 NOTE — PROGRESS NOTES
Delphine Millan  : 1955  [place]  Secondary: Andrea Harris at [facility]  [address] , [address], [address]  [phone]   [phone]       [address] [place] [address] 2020   ICD-10: Treatment Diagnosis: R27.8 Lack of coordination (muscle incoordination), R35.0 Frequency of micturition, R39.15 Urgency of Urination Excludes1: urge incontinence (N39.41,N39.46)    Precautions/Allergies:   Betadine [povidone-iodine] and Sulfa (sulfonamide antibiotics)    MEDICAL/REFERRING DIAGNOSIS:  Urinary frequency [R35.0]   DATE OF ONSET: 1 year ago  REFERRING PHYSICIAN: Alis Byrd,*  MD Orders: evaluate and treat  Return MD Appointment: -   DISCHARGE SUMMARY: Ms. Dani Elliott has been seen in skilled PT from 20 to 20. Patient has attended 5 out of 5 scheduled visits, with 0 cancellation(s) and 0 no shows. Treatment has emphasized PFM coordination, urge suppression, manual therapy. Patient responded well to therapy, with improvements in urgency and feeling of pressure. Pt has achieved goals as indicated below and all questions have been answered to their satisfaction. Pt was invited to call with any further questions or concerns as needed. INITIAL ASSESSMENT:  Ms. Dani Elliott presents with pelvic floor muscle over activity, lack of coordination, and strength. . Patient also has mild bladder health habits that may be worsening symptoms. Patient would benefit from skilled physical therapy to address her deficits and maximize her function. GOALS: (Goals have been discussed and agreed upon with patient.)  1. Patient will verbalize an understanding of pelvic anatomy and causes of incontinence MET  2. Patient will demonstrate I with basic PFM HEP to improve awareness, coordination, and timing of PFM MET  3.  Patient will demonstrate 10 quick flicks of the pelvic floor muscle group, without compensation, to implement urge suppression appropriately with urgency of urination and decrease the number of pads used per day. MET  4. Pt with demonstrate normal voluntary relaxation of the pelvic floor muscle group to improve pelvic floor ROM and decrease pain. MET  5. Pt will independently perform proper toilet position to improve bowel emptying. MET    Urinary: Frequency 5-8 x/day, 0 x/night. Positive for urinary urgency. Negative for stress urinary incontinence, urge urinary incontinence, urinary hesitancy/intermittency, dysuria, hematuria, urinary frequency and incomplete emptying, nocturia and nocturnal enuresis. Pt does not use pads for urinary protection; 0 pads per day (PPD). Fluid intake: 60 oz water/day; bladder irritants include: 1 cups coffee in am, glass of wine in evening. Bowel: Frequency varies. Positive for constipation. Negative for pain with bowel movement, pushing/straining with bowel movement, fecal incontinence and incomplete emptying. Use of stool softeners or laxatives? YES  Sexual: Pt is sexually active with male partners. negative for dyspareunia. Uses lubrication. Pelvic Organ Prolapse/Pelvic Pain: Location: bladder. OB History: Number of pregnancies: 3 Number of vaginal deliveries: 2 Number of c-sections: 0. External Observations:  · Voluntary contraction: []? absent     [x]? present        Pelvic Floor Muscle (PFM) Assessment:  · Vaginal vault size: []? decreased     []? increased     [x]? WNL  · Muscle volume: []? decreased     [x]? WNL     []? Defect  · PFM tension: []? decreased     []? increased     [x]? WNL     Contraction ability:  Voluntary contraction: []? absent     []? weak     [x]? moderate      []? strong  Voluntary relaxation: []? absent     []? partial     [x]?  complete   MMT: 3/5   PFM endurance: 3 seconds   Repetitions of endurance contractions: 4    OUTCOME MEASURE:   Tool Used: Pelvic Floor Impact Questionnaire--short form 7 (PFIQ-7)   Score: Initial:   · Bladder or Urine: 0  · Bowel or Rectum: 0  · Vagina or Pelvis: 0 Most Recent: X (Date: -- )  · Bladder or Urine:0  · Bowel or Rectum: 0  · Vagina or Pelvis: 0   Interpretation of Score: Each of the 7 sections is scored on a scale from 0-3; 0 representing \"Not at all\", 3 representing \"Quite a bit\". The mean value is taken from all the answered items, then multiplied by 100 to obtain the scale score, ranging from 0-100. This process is repeated for each column representing bowel, bladder, and pelvic pain. .  Total Duration:   PT Patient Time In/Time Out  Time In: 1400  Time Out: 1445      Regarding Carmel Perez's therapy, I certify that the treatment plan above will be carried out by a therapist or under their direction. Thank you for this referral,  Baby Riedel, DPT     Referring Physician Signature:  Price Varela,* _______________________________ Date _____________

## 2020-08-20 NOTE — PROGRESS NOTES
Maile Lockwood  : 1955  [place]  [facility]: [facility] [address], [address]  [phone]   [phone]      OUTPATIENT PHYSICAL THERAPY: Daily Treatment Note 2020  ICD-10: Treatment Diagnosis: R27.8 Lack of coordination (muscle incoordination), R35.0 Frequency of micturition, R39.15 Urgency of Urination Excludes1: urge incontinence (N39.41,N39.46)    Precautions/Allergies:   Betadine [povidone-iodine] and Sulfa (sulfonamide antibiotics)    MEDICAL/REFERRING DIAGNOSIS:  Urinary frequency [R35.0]   DATE OF ONSET: 1 year ago  REFERRING PHYSICIAN: Jacky Santa,*  MD Orders: evaluate and treat  Return MD Appointment: -     Pre-treatment Symptoms/Complaints:  Patient reports went from 2 cups to 1 cup of coffee. This is the only time she feels a strong urge. Pain: Initial:   0 Post Session:  0/10   Medications Last Reviewed:  2020   Updated Objective Findings:  see below   Ms. Nakul Love is a 75 yo female referred to pelvic floor physical therapy (PFPT) by Jacky Santa,* 2/2 urinary frequency. Pt reports that symptoms began 1 year ago. . [place] reports she lifted a very heavy wool carpet the next morning she couldn't walk. She had problems with her psoas. She had physical therapy done for this. But all of this started her bladder problems. She thought her bladder dropped. She doesn't empty all the way. Urgency increased. She had an A/P repair back in 80. She feels like her bladder is right there. She got checked out by her OBGYN. She does not want to have a pessary at this time. She uses a natural cream (DHEA) to help with moisture. She feels like every time she stands up she has to use the bathroom. If she is not that full she can stop the flow of urine. If she has a strong flow she cannot stop it.       Urinary: Frequency 5-8 x/day, 0 x/night.  Positive for urinary urgency. Negative for stress urinary incontinence, urge urinary incontinence, urinary hesitancy/intermittency, dysuria, hematuria, urinary frequency and incomplete emptying, nocturia and nocturnal enuresis. Pt does not use pads for urinary protection; 0 pads per day (PPD). Fluid intake: 60 oz water/day; bladder irritants include: 1 cups coffee in am, glass of wine in evening. Bowel: Frequency varies. Positive for constipation. Negative for pain with bowel movement, pushing/straining with bowel movement, fecal incontinence and incomplete emptying. Use of stool softeners or laxatives? YES  Sexual: Pt is sexually active with male partners. negative for dyspareunia. Uses lubrication. Pelvic Organ Prolapse/Pelvic Pain: Location: bladder. OB History: Number of pregnancies: 3 Number of vaginal deliveries: 2 Number of c-sections: 0. External Observations:  · Voluntary contraction: []? absent     [x]? present        Pelvic Floor Muscle (PFM) Assessment:  · Vaginal vault size: []? decreased     []? increased     [x]? WNL  · Muscle volume: []? decreased     [x]? WNL     []? Defect  · PFM tension: []? decreased     []? increased     [x]? WNL     Contraction ability:  Voluntary contraction: []? absent     []? weak     [x]? moderate      []? strong  Voluntary relaxation: []? absent     []? partial     [x]? complete   MMT: 3/5   PFM endurance: 3 seconds   Repetitions of endurance contractions: 4           Palpation: via vaginal canal   Superficial Pelvic Floor Musculature (PFM): Tender? Intermediate PFM Tender? Deep PFM Tender? Superficial Transverse Perineal []? Right  []? Left  []? N/T Deep Transverse Perineal []? Right  []? Left  []? N/T Puborectalis []? Right  []? Left  []? N/T   Ischiocavernosus []? Right  []? Left  []? N/T Compressor Urethra []? Right  []? Left  []? N/T Pubococcygeus []? Right  []? Left  []? N/T   Bulbocavernosus []? Right  []? Left  []? N/T     Ileococcygeus []? Right  []?  Left []?N/T           Obturator Internus []? Right  []? Left  []? N/T           Coccygeus []? Right  []? Left  []? N/T        TREATMENT:   THERAPEUTIC EXERCISE: (25 minutes):  Exercises per grid below to improve mobility, strength and coordination. Required minimal visual, verbal and manual cues to promote proper body alignment, promote proper body posture and promote proper body mechanics. Progressed resistance, range and repetitions as indicated. All exercises performed with emphasis on kegel and breathing in order improve coordination, awareness and to minimize symptoms. Date:  7-21-20 Date:  8-11-20 Date:  8-20-20   Activity/Exercise Parameters Parameters Parameters   Patient Education Discussed HEP and POC  Verbally reviewed HEP   Bladder health   reviewed     Drops   x10     Bridge    x15    Clamshell    x15    Seated March    x15    Sit to Stand    x15    Quadruped    x15    Rows    x15 blue band    Extensions    x15 blue band    Sidestepping    50 feet x 2             Pt gives verbal consent to internal  assessment/treatment  no chaperon present. NEURO REEDUCATION: (0 minutes) to improve control and coordination of pelvic floor     Date:  7-28-20 Date:   Date:     Activity/Exercise Parameters Parameters Parameters   Biofeedback With sEMG was utilized for coordination of PFM.  Supine, Sitting, Standing, prone                                           MANUAL THERAPY: (0 minutes) to improve soft tissue tone    Date Type Location Comments   8/20/2020 Internal assessment/treatment Via vaginal canal SCS, CR, SP    KT tape Coccyx I strip with 75% tension       SI correction pelvis Left hamstring/Right Quad MET for correction    Shot-gun Pubic bone Abduction/adduction resistance       Lumbar spine Oscillations at end range Right rotation                 (Used abbreviations: MET - muscle energy technique; SCS- Strain counter strain; CTM-Connective tissue mobilizations; CR- Contract/relax; SP- Sustained pressure, TrP-Trigger point release, IASTM- Instrument assisted soft tissue mobilizations, TDN-Trigger point dry needling)    THERAPEUTIC ACTIVITY: (0 minutes)   -discussed, reviewed, performed urge suppression        [facility]     Treatment/Session Summary:  Pt reports good understanding of plan of care, as well as prescribed home exercise program.  All questions were answered to pt's satisfaction. Pt was invited to call with any further questions or concerns.   ·   Total Treatment Billable Duration:  25minutes there ex  PT Patient Time In/Time Out  Time In: 1400  Time Out: Yifan Valdovinos, DPT    Future Appointments   Date Time Provider Shlomo Calderoni   9/3/2020  2:00 PM LINA [facility]unders [facility]   9/10/2020  2:00 PM LINA Saunders [facility]   11/30/2020  3:30 PM RAD[facility]

## 2020-09-03 ENCOUNTER — APPOINTMENT (OUTPATIENT)
Dept: PHYSICAL THERAPY | Age: 65
End: 2020-09-03

## 2020-09-10 ENCOUNTER — APPOINTMENT (OUTPATIENT)
Dept: PHYSICAL THERAPY | Age: 65
End: 2020-09-10

## 2020-11-30 ENCOUNTER — HOSPITAL ENCOUNTER (OUTPATIENT)
Dept: MAMMOGRAPHY | Age: 65
Discharge: HOME | End: 2020-11-30
Attending: INTERNAL MEDICINE
Payer: MEDICARE

## 2020-11-30 DIAGNOSIS — Z12.31 VISIT FOR SCREENING MAMMOGRAM: ICD-10-CM

## 2020-11-30 PROCEDURE — 77063 BREAST TOMOSYNTHESIS BI: CPT

## 2021-08-16 ENCOUNTER — TRANSCRIBE ORDER (OUTPATIENT)
Dept: SCHEDULING | Age: 66
End: 2021-08-16

## 2021-08-16 DIAGNOSIS — Z12.31 SCREENING MAMMOGRAM FOR HIGH-RISK PATIENT: Primary | ICD-10-CM

## 2021-12-01 ENCOUNTER — HOSPITAL ENCOUNTER (OUTPATIENT)
Dept: MAMMOGRAPHY | Age: 66
Discharge: HOME | End: 2021-12-01
Attending: INTERNAL MEDICINE
Payer: MEDICARE

## 2021-12-01 DIAGNOSIS — Z12.31 SCREENING MAMMOGRAM FOR HIGH-RISK PATIENT: ICD-10-CM

## 2021-12-01 PROCEDURE — 77063 BREAST TOMOSYNTHESIS BI: CPT

## 2022-12-06 ENCOUNTER — HOSPITAL ENCOUNTER (OUTPATIENT)
Dept: MAMMOGRAPHY | Age: 67
Discharge: HOME | End: 2022-12-09
Payer: MEDICARE

## 2022-12-06 DIAGNOSIS — Z12.31 SCREENING MAMMOGRAM FOR HIGH-RISK PATIENT: ICD-10-CM

## 2022-12-06 PROCEDURE — 77067 SCR MAMMO BI INCL CAD: CPT

## 2023-07-10 ENCOUNTER — TRANSCRIBE ORDERS (OUTPATIENT)
Dept: SCHEDULING | Age: 68
End: 2023-07-10

## 2023-07-10 DIAGNOSIS — Z12.31 ENCOUNTER FOR SCREENING MAMMOGRAM FOR MALIGNANT NEOPLASM OF BREAST: Primary | ICD-10-CM

## 2023-12-07 ENCOUNTER — HOSPITAL ENCOUNTER (OUTPATIENT)
Dept: MAMMOGRAPHY | Age: 68
Discharge: HOME | End: 2023-12-07
Payer: MEDICARE

## 2023-12-07 VITALS — HEIGHT: 62 IN | BODY MASS INDEX: 18.03 KG/M2 | WEIGHT: 98 LBS

## 2023-12-07 DIAGNOSIS — Z12.31 ENCOUNTER FOR SCREENING MAMMOGRAM FOR MALIGNANT NEOPLASM OF BREAST: ICD-10-CM

## 2023-12-07 PROCEDURE — 77063 BREAST TOMOSYNTHESIS BI: CPT
